# Patient Record
Sex: FEMALE | Race: WHITE | NOT HISPANIC OR LATINO | URBAN - METROPOLITAN AREA
[De-identification: names, ages, dates, MRNs, and addresses within clinical notes are randomized per-mention and may not be internally consistent; named-entity substitution may affect disease eponyms.]

---

## 2020-01-01 ENCOUNTER — OFFICE VISIT - HEALTHEAST (OUTPATIENT)
Dept: FAMILY MEDICINE | Facility: CLINIC | Age: 30
End: 2020-01-01

## 2020-01-01 ENCOUNTER — COMMUNICATION - HEALTHEAST (OUTPATIENT)
Dept: FAMILY MEDICINE | Facility: CLINIC | Age: 30
End: 2020-01-01

## 2020-01-01 DIAGNOSIS — Z97.5 IUD (INTRAUTERINE DEVICE) IN PLACE: ICD-10-CM

## 2020-01-01 DIAGNOSIS — Z12.4 CERVICAL CANCER SCREENING: ICD-10-CM

## 2020-01-01 DIAGNOSIS — Z30.433 ENCOUNTER FOR REMOVAL AND REINSERTION OF INTRAUTERINE CONTRACEPTIVE DEVICE (IUD): ICD-10-CM

## 2020-01-01 LAB
BKR LAB AP ABNORMAL BLEEDING: NO
BKR LAB AP BIRTH CONTROL/HORMONES: NORMAL
BKR LAB AP CERVICAL APPEARANCE: NORMAL
BKR LAB AP GYN ADEQUACY: NORMAL
BKR LAB AP GYN INTERPRETATION: NORMAL
BKR LAB AP HPV REFLEX: NORMAL
BKR LAB AP LMP: NORMAL
BKR LAB AP PATIENT STATUS: NORMAL
BKR LAB AP PREVIOUS ABNORMAL: NO
BKR LAB AP PREVIOUS NORMAL: 2015
C TRACH DNA SPEC QL PROBE+SIG AMP: NEGATIVE
CLUE CELLS: NORMAL
HIGH RISK?: NO
HPV SOURCE: NORMAL
HUMAN PAPILLOMA VIRUS 16 DNA: NEGATIVE
HUMAN PAPILLOMA VIRUS 18 DNA: NEGATIVE
HUMAN PAPILLOMA VIRUS FINAL DIAGNOSIS: NORMAL
HUMAN PAPILLOMA VIRUS OTHER HR: NEGATIVE
N GONORRHOEA DNA SPEC QL NAA+PROBE: NEGATIVE
PATH REPORT.COMMENTS IMP SPEC: NORMAL
RESULT FLAG (HE HISTORICAL CONVERSION): NORMAL
SPECIMEN DESCRIPTION: NORMAL
TRICHOMONAS, WET PREP: NORMAL
YEAST, WET PREP: NORMAL

## 2020-01-01 ASSESSMENT — PATIENT HEALTH QUESTIONNAIRE - PHQ9: SUM OF ALL RESPONSES TO PHQ QUESTIONS 1-9: 0

## 2021-05-27 ASSESSMENT — PATIENT HEALTH QUESTIONNAIRE - PHQ9: SUM OF ALL RESPONSES TO PHQ QUESTIONS 1-9: 0

## 2021-06-04 VITALS
DIASTOLIC BLOOD PRESSURE: 90 MMHG | BODY MASS INDEX: 27.87 KG/M2 | HEART RATE: 104 BPM | SYSTOLIC BLOOD PRESSURE: 126 MMHG | RESPIRATION RATE: 20 BRPM | WEIGHT: 138 LBS | TEMPERATURE: 98.4 F

## 2021-06-07 NOTE — TELEPHONE ENCOUNTER
RN cannot approve Refill Request    RN can NOT refill this medication med is not covered by policy/route to provider     . Last office visit: Visit date not found Last Physical: Visit date not found Last MTM visit: Visit date not found Last visit same specialty: Visit date not found.  Next visit within 3 mo: Visit date not found  Next physical within 3 mo: Visit date not found      Inessa Coles, [facility] Triage/Med Refill 4/23/2020    Requested Prescriptions   Pending Prescriptions Disp Refills     levonorgestreL (MIRENA) 20 mcg/24 hours (5 yrs) 52 mg IUD 1 each 0       There is no refill protocol information for this order

## 2021-06-07 NOTE — PROGRESS NOTES
"Ciera Salcedo is a 30 y.o. female who is being evaluated via a billable video visit.      The patient has been notified of following:     \"This video visit will be conducted via a call between you and your physician/provider. We have found that certain health care needs can be provided without the need for an in-person physical exam.  This service lets us provide the care you need with a video conversation.  If a prescription is necessary we can send it directly to your pharmacy.  If lab work is needed we can place an order for that and you can then stop by our lab to have the test done at a later time.    Video visits are billed at different rates depending on your insurance coverage. Please reach out to your insurance provider with any questions.    If during the course of the call the physician/provider feels a video visit is not appropriate, you will not be charged for this service.\"    Patient has given verbal consent to a Video visit? Yes    Patient would like to receive their AVS by AVS Preference: Taylor.    Patient would like the video invitation sent by: Text to cell phone: [phone]    Will anyone else be joining your video visit? No        Video Start Time: 9:36 AM    Additional provider notes:  ASSESSMENT/PLAN:  1. IUD (intrauterine device) in place           Needs IUD replacement - Mirena was placed 1/2015, due for replacement in January 2020.  I will arrange for her to be seen by Dr. Lowery for this replacement, this week.    Return in about 2 days (around 5/1/2020) for IUD replacement.      There are no discontinued medications.  There are no Patient Instructions on file for this visit.    Chief Complaint:  Chief Complaint   Patient presents with     Contraception     discuss birth control       HPI:   Ciera Salcedo is a 30 y.o. 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  No past surgical history on file.  Social History     Socioeconomic History     Marital status: Single     Spouse name: Not on file     Number of children: Not on file     Years of education: Not on file     Highest education level: Not on file   Occupational History     Not on file   Social Needs     Financial resource strain: Not on file     Food insecurity     Worry: Not on file     Inability: Not on file     Transportation needs     Medical: Not on file     Non-medical: Not on file   Tobacco Use     Smoking status: Current Every Day Smoker     Types: Cigarettes     Smokeless tobacco: Never Used     Tobacco comment: Yes to second hand smoking at work.    Substance and Sexual Activity     Alcohol use: Not on file     Drug use: Not on file     Sexual activity: Not on file   Lifestyle     Physical activity     Days per week: Not on file     Minutes per session: Not on file     Stress: Not on file   Relationships     Social connections     Talks on phone: Not on file     Gets together: Not on file     Attends Yazidi service: Not on file     Active member of club or organization: Not on file     Attends meetings of clubs or organizations: Not on file     Relationship status: Not on file     Intimate partner violence     Fear of current or ex partner: Not on file     Emotionally abused: Not on file     Physically abused: Not on file     Forced sexual activity: Not on file   Other Topics Concern     Not on file   Social History Narrative     Not on file     No family history on file.    Meds:    Current Outpatient Medications:      levonorgestrel (MIRENA) 20 mcg/24 hr (5 years) IUD, Use As Directed., Disp: , Rfl:      acetaminophen (TYLENOL) 325 MG tablet,  Take tablet PRN, Disp: , Rfl:      gabapentin (NEURONTIN) 300 MG capsule, Take 1 capsule (300 mg total) by mouth 3 (three) times a day., Disp: 90 capsule, Rfl: 2    Allergies:  No Known Allergies    ROS:  Pertinent positives as noted in HPI; otherwise 12 point ROS negative.      Physical Exam:  EXAM:  There were no vitals taken for this visit.   Gen:  NAD, appears well, well-hydrated  Neuro:  No asymmetry  Skin:  Warm/dry      Results:  No results for this visit      Video-Visit Details    Type of service:  Video Visit    Video End Time (time video stopped): 9:42 AM  Originating Location (pt. Location): Home    Distant Location (provider location):  [facility]/OB     Mode of Communication:  Video Conference via Dox[employer]      Anne Marie Nunez MD

## 2021-06-07 NOTE — PATIENT INSTRUCTIONS - HE
Post IUD instructions    You should use a backup method of contraception for the next two weeks.   You should check your IUD strings every month, preferably just after a period.  If unable to locate the IUD strings or they have lengthened you should make an appointment to see your provider immediately and to use a backup method of birth control in the meantime.    The IUD is a very reliable method of birth control but it can fail. If a failure occurs, it is very likely to be a  tubal  or ectopic pregnancy. If you ever suspect a pregnancy you should seek medical attention immediately.    You should follow up immediately if any signs of uterine infection occur during the next week including fever > 100.4, foul smelling discharge or increasing pelvic pain.    If you have a Mirena IUD, it is common to have more bleeding days per month than usual over the next 4 to 6 months while your uterus acclimates to the IUD. After that your periods will likely be short and light, and there is a chance that your period may stop altogether.  The Mirena IUD provides reliable contraception up to 5 years.    If you have a Paragard IUD it is common to have heavier periods and more cramps with your periods. If this becomes a concern for you please make an appointment to see your provider.  The Paragard IUD provides reliable contraception up to 10 years.    You should make a follow up appointment in 4-8 weeks for an IUD check    PLEASE CALL THE OFFICE FOR:   Fever over 100.4    Severe lower abdominal pain   Heavy or prolonged vaginal bleeding   Vaginal discharge with an unpleasant odor   Any other concerns

## 2021-06-07 NOTE — PROGRESS NOTES
IUD Insertion Procedure Note  PREPROCEDURE  Questions:    Copper allergy? No    Premont monogamy? yes    Hx of PID/STD? No check in past 5 years- and testing neg with last IUD insertion.      No LMP recorded. Patient has had an implant., No obstetric history on file.; last pap 2014    Current birth control: Mirena IUD placed 1/15/15 and due for replacement   Patient Counseled Regarding:    Mechanism of action    Effectiveness rates and duration    Complications    Insertion and removal procedures  Discussed risks and benefits and written consent obtained  Patient pre-medicated with nothing, patient declines.  UPT Obtained, result:  No results found for this or any previous visit (from the past 24 hour(s)).    PROCEDURE    Cervix inspected. Uterus normal size and position. Prior IUD removed easily and intact.    Swabs obtained: pap, wet prep, lowell/chlam    Cervix cleansed with betadine x3    Tenaculum applied on anterior lip of cervix    Uterus sounded to 7 cm    Mirena IUD inserted    IUD strings trimmed to 3 cm    Tenaculum removed    Complications: None. No excessive pain or bleeding.    ASSESSMENT  Insertion of Mirena IUD.    PLAN    Discussed warning signs for IUD complications    RTC in 4 weeks for IUD check, and PRN    Instructed to check stings monthly, after menses    Instructed IUD must not be in place longer than5 years    Pelvic rest x 1 week

## 2021-06-16 PROBLEM — Z97.5 IUD (INTRAUTERINE DEVICE) IN PLACE: Status: ACTIVE | Noted: 2020-01-01

## 2021-08-21 ENCOUNTER — HEALTH MAINTENANCE LETTER (OUTPATIENT)
Age: 31
End: 2021-08-21